# Patient Record
Sex: MALE | Race: WHITE | NOT HISPANIC OR LATINO | Employment: FULL TIME | URBAN - METROPOLITAN AREA
[De-identification: names, ages, dates, MRNs, and addresses within clinical notes are randomized per-mention and may not be internally consistent; named-entity substitution may affect disease eponyms.]

---

## 2024-05-16 ENCOUNTER — HOSPITAL ENCOUNTER (EMERGENCY)
Facility: CLINIC | Age: 29
Discharge: LEFT BEFORE DISCHARGE | End: 2024-05-16
Attending: EMERGENCY MEDICINE | Admitting: EMERGENCY MEDICINE
Payer: COMMERCIAL

## 2024-05-16 ENCOUNTER — APPOINTMENT (OUTPATIENT)
Dept: RADIOLOGY | Facility: CLINIC | Age: 29
End: 2024-05-16
Attending: EMERGENCY MEDICINE
Payer: COMMERCIAL

## 2024-05-16 VITALS
HEART RATE: 59 BPM | HEIGHT: 65 IN | OXYGEN SATURATION: 97 % | DIASTOLIC BLOOD PRESSURE: 59 MMHG | WEIGHT: 138 LBS | RESPIRATION RATE: 14 BRPM | SYSTOLIC BLOOD PRESSURE: 95 MMHG | TEMPERATURE: 97.7 F | BODY MASS INDEX: 22.99 KG/M2

## 2024-05-16 DIAGNOSIS — T40.2X1A OPIOID OVERDOSE, ACCIDENTAL OR UNINTENTIONAL, INITIAL ENCOUNTER (H): ICD-10-CM

## 2024-05-16 DIAGNOSIS — Z53.29 LEFT AGAINST MEDICAL ADVICE: ICD-10-CM

## 2024-05-16 LAB
ATRIAL RATE - MUSE: 64 BPM
DIASTOLIC BLOOD PRESSURE - MUSE: 97 MMHG
INTERPRETATION ECG - MUSE: NORMAL
P AXIS - MUSE: 22 DEGREES
PR INTERVAL - MUSE: 132 MS
QRS DURATION - MUSE: 96 MS
QT - MUSE: 378 MS
QTC - MUSE: 389 MS
R AXIS - MUSE: 20 DEGREES
SYSTOLIC BLOOD PRESSURE - MUSE: 139 MMHG
T AXIS - MUSE: 45 DEGREES
VENTRICULAR RATE- MUSE: 64 BPM

## 2024-05-16 PROCEDURE — 250N000011 HC RX IP 250 OP 636: Mod: JZ | Performed by: EMERGENCY MEDICINE

## 2024-05-16 PROCEDURE — 258N000003 HC RX IP 258 OP 636: Performed by: EMERGENCY MEDICINE

## 2024-05-16 PROCEDURE — 96361 HYDRATE IV INFUSION ADD-ON: CPT

## 2024-05-16 PROCEDURE — 93005 ELECTROCARDIOGRAM TRACING: CPT | Performed by: EMERGENCY MEDICINE

## 2024-05-16 PROCEDURE — 99284 EMERGENCY DEPT VISIT MOD MDM: CPT | Mod: 25

## 2024-05-16 PROCEDURE — 96374 THER/PROPH/DIAG INJ IV PUSH: CPT | Mod: 59

## 2024-05-16 PROCEDURE — 71046 X-RAY EXAM CHEST 2 VIEWS: CPT

## 2024-05-16 RX ORDER — NALOXONE HYDROCHLORIDE 1 MG/ML
0.2 INJECTION INTRAMUSCULAR; INTRAVENOUS; SUBCUTANEOUS ONCE
Status: COMPLETED | OUTPATIENT
Start: 2024-05-16 | End: 2024-05-16

## 2024-05-16 RX ADMIN — NALOXONE HYDROCHLORIDE 0.2 MG: 1 INJECTION PARENTERAL at 22:39

## 2024-05-16 RX ADMIN — SODIUM CHLORIDE 1000 ML: 9 INJECTION, SOLUTION INTRAVENOUS at 21:44

## 2024-05-16 ASSESSMENT — COLUMBIA-SUICIDE SEVERITY RATING SCALE - C-SSRS
6. HAVE YOU EVER DONE ANYTHING, STARTED TO DO ANYTHING, OR PREPARED TO DO ANYTHING TO END YOUR LIFE?: NO
2. HAVE YOU ACTUALLY HAD ANY THOUGHTS OF KILLING YOURSELF IN THE PAST MONTH?: NO
1. IN THE PAST MONTH, HAVE YOU WISHED YOU WERE DEAD OR WISHED YOU COULD GO TO SLEEP AND NOT WAKE UP?: NO

## 2024-05-16 ASSESSMENT — ACTIVITIES OF DAILY LIVING (ADL)
ADLS_ACUITY_SCORE: 37

## 2024-05-17 NOTE — ED NOTES
Dr. Almaguer at bedside explaining the risks of pt the leaving against medical advice. Verbalized understanding and still wanting to leave. PIV removed, AMA form signed witnessed by this writer. Pt called for an uber.

## 2024-05-17 NOTE — ED NOTES
Bed: [facility]  Expected date:   Expected time:   Means of arrival:   Comments:  ems

## 2024-05-17 NOTE — ED PROVIDER NOTES
EMERGENCY DEPARTMENT ENCOUNTER      NAME: Issa Scott  AGE: 29 year old male  YOB: 1995  MRN: [id number]  EVALUATION DATE & TIME: 5/16/2024  8:04 PM    PCP: System, Provider Not In    ED PROVIDER: Timothy Almaguer MD      Chief Complaint   Patient presents with    Drug Overdose         FINAL IMPRESSION:  1. Opioid overdose, accidental or unintentional, initial encounter (H)    2. Left against medical advice          ED COURSE & MEDICAL DECISION MAKING:    Pertinent Labs & Imaging studies reviewed. (See chart for details)  29 year old male presents to the Emergency Department for evaluation of heroin overdose    Presents with GCS 15.    Patient got 0.5 intranasal Narcan by EMS.  I spoke with poison control recommends 1 hour observation      ED Course as of 05/16/24 2328   Thu May 16, 2024   2114 Spoke with poison control who recommends 1 hour observation minimum   2138 Patient presents after getting CPR after injecting heroin   2138 Chest x-ray ordered since patient did get CPR to evaluate for any pneumothorax.  Chest x-ray does not show any fractured ribs or pneumothorax per my independent read   2141 Patient was alarming that he was apneic but I arrived nursing says it is a poor waveform and he is arousable to voice with respiratory rate of 12 and end-tidal 50 will continue to watch closely.  He is in a hallway bed right in front of the charge desk.   2253 I did recheck patient and his respiratory rate was 18 end-tidal was 58 so I ordered 0.2 of IV Narcan.  He is now awake and refusing to stay.  He states he been through chemical dependency treatment before.  He states he has Narcan at home.  He is gone for treatment bunch of times.  He says he is good to avoid any more opiates.  He is refusing to stay for couple more hours of observation   2254 Patient will leave AGAINST MEDICAL ADVICE.  He has capacity to make this decision.  He is not legally holdable based on my assessment.  He has Narcan at home and  capacity.   6774 I speak with poison control earlier and they recommend 1 hour observation post initial Narcan dose     8:20 PM I met with the patient, obtained history, performed an initial exam, and discussed options and plan for diagnostics and treatment here in the ED.  10:22 PM I rechecked patient.  10:53 PM Nurse reports the patient wants to go home. Patient rechecked. The patient explained that he has both injectable and intranasal narcan at home. Discussed the dangers of the patient leaving AMA, patient understands that he could stop breathing if he goes home. Upon exam, patient has the capacity to decide to leave. The patient is discharging AMA.    Medical Decision Making  Obtained supplemental history:Supplemental history obtained?: EMS  Reviewed external records: External records reviewed?: Documented in chart  Care impacted by chronic illness:Mental Health and Other: asthma  Care significantly affected by social determinants of health:Alcohol Abuse and/or Recreational Drug Use  Did you consider but not order tests?: Work up considered but not performed and documented in chart, if applicable  Did you interpret images independently?: Independent interpretation of ECG and images noted in documentation, when applicable.  Consultation discussion with other provider:Did you involve another provider (consultant, , pharmacy, etc.)?: I discussed the care with another health care provider, see documentation for details.  Discharge. No recommendations on prescription strength medication(s). I considered admission, but ultimately discharged patient after patient refused further evaluation.    At the conclusion of the encounter I discussed the results of all of the tests and the disposition. The questions were answered. The patient or family acknowledged understanding and was agreeable with the care plan.     MEDICATIONS GIVEN IN THE EMERGENCY:  Medications   sodium chloride 0.9% BOLUS 1,000 mL (0 mLs Intravenous  Stopped 5/16/24 1269)   naloxone (NARCAN) injection 0.2 mg (0.2 mg Intravenous $Given 5/16/24 0213)       NEW PRESCRIPTIONS STARTED AT TODAY'S ER VISIT  Discharge Medication List as of 5/16/2024 10:55 PM             =================================================================    HPI    Patient information was obtained from: Patient, EMS    Use of : N/A         Issa Scott is a 29 year old male with a pertinent history of heroin addiction, marijuana abuse, chemical dependency, anxiety, polydrug abuse, seizure disorder, depression, asthma who presents to this ED via EMS for evaluation of accidental drug overdose.    Per EMS, patient was found unresponsive with his friend performing CPR on him. EMS continued CPR at the scene, inserted OPA, and gave 0.5 mg of Narcan just prior to arrival. Patient is alert and orientated x 4.     Per patient, he reports the last thing he remembers is using heroin and then waking up to paramedics surrounding him. He used heroin intravenously around 7 pm. He did not take anything else. Patient reports feeling tired. This is the first time he overdosed and states that he hasn't used heroin for awhile. Patient is currently in a DBT program. He was taking methadone and suboxone but relapsed on meth in February.  Not currently on any long-acting agonist such as methadone or Suboxone    Denies being suicidal.    Is currently in DBT treatment        REVIEW OF SYSTEMS   Review of Systems Refer to HPI    PAST MEDICAL HISTORY:  Past Medical History:   Diagnosis Date    Asthma     Environmental allergies        PAST SURGICAL HISTORY:  Past Surgical History:   Procedure Laterality Date    TONSILLECTOMY  2000           CURRENT MEDICATIONS:    albuterol (PROVENTIL HFA: VENTOLIN HFA) 108 (90 BASE) MCG/ACT inhaler  fexofenadine (ALLEGRA) 30 MG tablet  fluticasone (FLONASE) 50 MCG/ACT nasal spray  fluticasone-salmeterol (ADVAIR DISKUS) 100-50 MCG/DOSE diskus inhaler  METHADONE HCL  "PO        ALLERGIES:  Allergies   Allergen Reactions    Other Environmental Allergy Unknown       FAMILY HISTORY:  Family History   Problem Relation Age of Onset    Asthma Brother     C.A.D. No family hx of     Diabetes No family hx of     Hypertension No family hx of     Cancer - colorectal No family hx of     Prostate Cancer No family hx of     Alcohol/Drug Father     Alcohol/Drug Maternal Grandfather        SOCIAL HISTORY:   Social History     Socioeconomic History    Marital status: Single   Substance and Sexual Activity    Alcohol use: Yes    Drug use: Yes     Types: Methamphetamines     Comment: 1 week ago    Sexual activity: Yes     Partners: Female   Social History Narrative    Lives in [place] with mom and step-dad. Does not get along with parents.  Goes to [employer] [place].       Social Determinants of Health     Financial Resource Strain: Low Risk  (5/16/2022)    [facility]    Financial Resource Strain     Difficulty of Paying Living Expenses: 3   Food Insecurity: No Food Insecurity (5/16/2022)    [facility]    Food Insecurity     Worried About Running Out of Food in the Last Year: 1   Transportation Needs: No Transportation Needs (5/16/2022)    Received from [facility]    Transportation Needs     Lack of Transportation (Medical): 1    [facility]    Social Connections   Housing Stability: Low Risk  (5/16/2022)    Received from [facility]    Housing Stability     Unable to Pay for Housing in the Last Year: 1       VITALS:  BP 95/59   Pulse 59   Temp 97.7  F (36.5  C)   Resp 14   Ht 1.651 m (5' 5\")   Wt 62.6 kg (138 lb)   SpO2 97%   BMI 22.96 kg/m      PHYSICAL EXAM      Vitals: BP 95/59   Pulse 59   Temp 97.7  F (36.5  C)   Resp 14   Ht 1.651 m (5' 5\")   Wt 62.6 kg " (138 lb)   SpO2 97%   BMI 22.96 kg/m    General: Appears in no acute distress, drowsy. Wakes up to voice.  Eyes: Conjunctivae non-injected. Sclera anicteric. Pinpoint pupils.  HENT: Atraumatic.  Neck: Supple.  Respiratory/Chest: Respiration unlabored.  Mild hypoventilation respiratory rate 8-12.  No hypoxemia  Abdomen: non distended  Musculoskeletal: Normal extremities. No edema or erythema.  Skin: Normal color. No rash or diaphoresis.  Neurologic: Face symmetric, moves all extremities spontaneously. Speech clear.  Psychiatric: Oriented to person, place, and time. Affect appropriate.    LAB:  All pertinent labs reviewed and interpreted.  Results for orders placed or performed during the hospital encounter of 24   Chest XR,  PA & LAT    Impression    IMPRESSION: Negative chest.   ECG 12-LEAD WITH MUSE (LHE)   Result Value Ref Range    Systolic Blood Pressure 139 mmHg    Diastolic Blood Pressure 97 mmHg    Ventricular Rate 64 BPM    Atrial Rate 64 BPM    MN Interval 132 ms    QRS Duration 96 ms     ms    QTc 389 ms    P Axis 22 degrees    R AXIS 20 degrees    T Axis 45 degrees    Interpretation ECG       Sinus rhythm  Normal ECG  No previous ECGs available  Confirmed by SEE ED PROVIDER NOTE FOR, ECG INTERPRETATION ([id number]),  FREDIS WHARTON ([id number]) on 2024 9:34:13 PM       EK May 2024 2009  Sinus rhythm  No previous ECG  Ventricular rate 6 4  MN interval 132  QRS 96  QTc 389  Axis 20  No ST change        RADIOLOGY:  Reviewed all pertinent imaging. Please see official radiology report.  Chest XR,  PA & LAT   Final Result   IMPRESSION: Negative chest.                I, Loulou Sweeney, am serving as a scribe to document services personally performed by Timothy Almaguer MD based on my observation and the provider's statements to me. I, Timothy Almaguer MD, attest that Loulou Sweeney is acting in a scribe capacity, has observed my performance of the services and has documented them in accordance with my  direction.    Timothy Almaguer MD  [facility] EMERGENCY ROOM  [facility] [facility] [id number] [phone]        Timothy Almaguer MD  05/16/24 2326

## 2024-05-17 NOTE — DISCHARGE INSTRUCTIONS
Chemical Dependency Resources    To access chemical dependency treatment you must have an assessment complete or have an updated assessment within 30 days of starting any program.   Information for this to be completed and to secure funding if you have medical assistance or no insurance can be found through your County's chemical health intake line.     If you have private insurance, call the customer service number on the back of your insurance card to find an in-network provider for substance abuse assessment. The ideal provider will be a treatment facility, licensed in the [facility].     For those with Medicaid with the [facility], you will need a Rule 25 assessment: The following are phone numbers for each [facility]. Rule 25 assessments must be completed by the county you reside in currently. Once approved for funding you can connect with a facility that does Rule 25 assessment.   [facility] - [phone]  [place] - [phone]  [facility] [phone]  [facility] [phone]  [facility] [phone]  [facility] [phone]  [place] - [phone]  [facility] [phone]    [place] [facility]  www.mntc.org  [phone] [address] [facility]  Serves adults and teens (minimum age is 16); has short-term treatment and a long-term recovery program; uses 12-step, cognitive behavioral therapy, motivational enhancement; faithbased, and recovery management; high school on-site and GED programming available      [facility]  [phone]  Mon-Friday: [address] [facility], [address]  Saturday: [facility] [facility] [address]  M-F assessments (7a-1:45p); Saturday assessments (7:45-10:45a)    [facility]  [phone]  [address] [place], [place], [address]  *by appointment only M-W; walk ins available Fridays from 10-2.      [facility] (Inpatient & Outpatient)  [email or web address]  [phone]  Phone consultation available 24/7  In-person Assessments  [address], [place] [address]  [address] [address] [address]  [address] [facility], [place] [address]  [address] [place] [address]      [facility]  [phone]  [address] [facility]  [place], [place], [address]  *walk in and appointments available by calling     [facility]  [phone] [address] [place], [place], [address]  *by appointment only M-Th    [facility] Adult Mental Health  [phone]  [address] [place], [place], [address]  *walk ins available M-F    [facility]  [phone]  [address] [place], [address]  *available by appointments only    [facility]  [phone]  [address] [place], [place], [address]  *available by appointment only    [facility] (Inpatient & Outpatient)  [email or web address]-health-[email or web address]/[email or web address]  [place], [place]  Contact  for Chemical Health Evaluation, [phone]  Funded by: Rule 25 in [facility] and [facility]. Medical Assistance (MA) providers of [facility], [facility], Blue Cross, [facility], [employer], Medicare A&B. Private insurance reviewed case by case.        [facility]  [email or web address]//[email or web address]/mental-health/adap.html  [phone]  Alcohol and Drug Abuse Program - [address], [address]  [place] [address]  Assessments are available during the day and on a walk-in basis Monday-Friday starting at 8am.      [facility]  [email or web address]  [phone] or [phone]  [address] [place] [address]  Services include screening assessments, medically supervised detoxification, inpatient and outpatient evaluation and referral, combined inpatient to outpatient treatment sequences, family counseling and aftercare.    [facility]  [phone]  [address] [place], [place], [address]  *walk in assessments available M-F  starting at 7 am.     [facility]   [phone]  [facility]  [address]  [place], [address]  *Walk in assessments availble M-F starting at 8 am    OR     [phone]  [facility]  [address] [place] [address]  *Walk in assessments available M-F starting at 8 am    [facility]  [phone]  [address] [place] [address]    [facility]   [phone]  [address] [place], [place], [address]  *available by appointment only    [facility]   [phone]  [address]  [place], [address]    [facility]  [phone]  [address] [place], [address]  *available by appointment    [facility]  [phone]  [address] [place], [address]  *walk ins available M-TH from 9-3    [facility]  [phone]  [address] [place], [address]    [place]  [email or web address]  [phone] [address] [place] [address]  [address] [address] [address]  Rule 25 Assessments, Substance Abuse Assessments, Men s & Women s Progr    [facility] (Does Rule 25 Assessments)  http://[email or web address]/  [phone]  [address] [place] [address], [address], [place] [address]    [facility]  [email or web address]-treatment  [phone], [address], [place], [place] [address] [phone], [address], [place], [place] [address] [phone], [address] [facility], [address], [place], [place] [address] [phone], [address], ([facility]), [place], [place] [id number] [phone], [address] [address] [place] [address]    If you are intoxicated, you may be required to detox at a detox facility before starting treatment. The following are detox facilities that you can self present to. All detox  facilities are able to help you complete an assessment/rule 25 prior to discharge if you choose:    [facility]: [address] [place], [place], [address].   [phone]  [facility]: [address] [place], [place], [address]  [phone]  [facility]: [address] [place], [place], [address]   [phone]  [facility]: [address] [place], [place], [address] [phone]    It is recommended that you abstain from all mood altering chemicals. Please contact the sober support hotline (539-515-3018) as needed; phones are answered 24 hours a day, 7 days a week. Other support hotlines include:  [facility] Mental Health & Addiction: [phone]  [facility] Support Line: [phone]    Ways to help cope with sobriety:   -- Take prescribed medicines as scheduled  -- Keep follow-up appointments   -- Talk to others about your concerns  -- Get regular exercise  -- Practice deep breathing skills   -- Eat a healthy diet   -- Use community resources, including hotline numbers, Critical access hospital crisis and support meetings  -- Stay sober and avoid places/people/things associated with substance use   --Maintain a daily schedule/routine   --Get at least 7-8 hours of sleep per night   --Create a list 10--20 healthy activities that you can do that are enjoyable and do not involve substance use   --Create daily goals (approx. 1-4 goals) per day and work to achieve them throughout the day.    [place] [facility] ([facility])  [facility] connects people seeking recovery to resources that help foster and sustain long-term recovery. Whether you are seeking resources for treatment, transportation, housing, job training, education, health care or other pathways to recovery, [facility] is a great place to start.     Phone: [phone]. www.[employer].org (Great listing of all types of recovery and non-recovery related resources)    [facility] [place]  Phone: 1-800-ALCOHOL  Website: HTTP://WWW.AA.ORG/    [facility] [place]  (970.639.8022 or http://aamin[place].org)  [facility] [place] (198-525-6978 or www.aasaul.org)    Narcotics Anonymous  Phone: 402.426.3736  Website: www.Online Prasad.Computime.    [facility] [address], [place], [place],   Phone: [phone]  Drop-in Hours: Monday-Friday 9-11:30 am. By appointment at other times.  Provides: [facility] is a drop-in center on the east side [facility] that provides a safe space for individuals who are homeless and have a history of chemical use. Sobriety is not a requirement but drugs and alcohol are not allowed on the property.  Services: Non-clients can access drop-in services such as Recovery and Harm Reduction Groups, referrals to case management, community activities, shower facilities, and a pool table. Individuals who are homeless and have chemical health needs may be eligible for enrollment into [facility]'s case management program. Clients and  work together to access benefits, treatment, health care, shelter, and external housing resources.    [facility] Chemical Assessment [facility] Referral [facility]  [address] [place], [place]  Phone: [phone]  Hours: Monday-Friday 8 am-5 pm.  Provides: Rule 25 assessment and referral for individuals seeking treatment or counseling for chemical dependency. Must be a resident of [facility]. There is no fee for assessment. There is some funding available for treatment programs.    [facility]   [address] [facility] Phone: [phone] Main: [phone]  Hours: Monday through Friday 7 am-5 pm  Provides: Daily drop-in Rule 25 assessments and weekly mental health assessments and services. Outpatient chemical dependency treatment (with sober recovery housing), relapse prevention, case management, and employment services. Outpatient and residential treatment for women with children. Specializes in assisting individuals with a history of relapse who face multiple barriers to  achieving stable recovery.  Remarks: No charge for most services or services can be billed through insurance. Gender-specific services and treatment for co-occurring substance abuse and mental health concerns offered.

## 2024-05-17 NOTE — ED NOTES
"Pt stated \"You guys aren't doing shit for me here, so I'm just gonna go.\" This was endorsed minutes after he received Narcan.   "

## 2024-05-17 NOTE — ED TRIAGE NOTES
Pt comes to the ED via EMS following a heroine overdose. EMS performed CPR on scene, inserted OPA, and gave 0.5 mg of Narcan just PTA. Pt is Aox4 in triage, ambulatory, and vitally stable.      Triage Assessment (Adult)       Row Name 05/16/24 2006          Triage Assessment    Airway WDL WDL        Respiratory WDL    Respiratory WDL WDL        Skin Circulation/Temperature WDL    Skin Circulation/Temperature WDL WDL        Cardiac WDL    Cardiac WDL WDL        Peripheral/Neurovascular WDL    Peripheral Neurovascular WDL WDL        Cognitive/Neuro/Behavioral WDL    Cognitive/Neuro/Behavioral WDL WDL

## 2024-06-27 ENCOUNTER — APPOINTMENT (OUTPATIENT)
Dept: CT IMAGING | Facility: CLINIC | Age: 29
End: 2024-06-27
Attending: EMERGENCY MEDICINE
Payer: COMMERCIAL

## 2024-06-27 VITALS
OXYGEN SATURATION: 99 % | SYSTOLIC BLOOD PRESSURE: 96 MMHG | HEART RATE: 95 BPM | TEMPERATURE: 97.1 F | HEIGHT: 65 IN | BODY MASS INDEX: 21.36 KG/M2 | WEIGHT: 128.2 LBS | RESPIRATION RATE: 20 BRPM | DIASTOLIC BLOOD PRESSURE: 64 MMHG

## 2024-06-27 PROBLEM — R40.20 LOC (LOSS OF CONSCIOUSNESS) (H): Status: ACTIVE | Noted: 2022-05-16

## 2024-06-27 PROBLEM — R56.9 SEIZURE-LIKE ACTIVITY (H): Status: ACTIVE | Noted: 2022-05-16

## 2024-06-27 PROBLEM — F13.20 BENZODIAZEPINE DEPENDENCE (H): Status: ACTIVE | Noted: 2021-01-16

## 2024-06-27 PROBLEM — F19.10 POLYDRUG ABUSE (H): Status: ACTIVE | Noted: 2024-06-27

## 2024-06-27 PROBLEM — F19.20 CHEMICAL DEPENDENCY (H): Status: ACTIVE | Noted: 2022-05-16

## 2024-06-27 PROBLEM — F11.20 OPIOID USE DISORDER, SEVERE, DEPENDENCE (H): Status: ACTIVE | Noted: 2021-01-16

## 2024-06-27 PROBLEM — G40.909 SEIZURE DISORDER (H): Status: ACTIVE | Noted: 2022-08-02

## 2024-06-27 PROCEDURE — 99281 EMR DPT VST MAYX REQ PHY/QHP: CPT | Mod: 25

## 2024-06-27 PROCEDURE — 70450 CT HEAD/BRAIN W/O DYE: CPT

## 2024-06-27 RX ORDER — LEVETIRACETAM 500 MG/1
500 TABLET ORAL
COMMUNITY
Start: 2022-08-02

## 2024-06-28 ENCOUNTER — HOSPITAL ENCOUNTER (EMERGENCY)
Facility: CLINIC | Age: 29
Discharge: LEFT BEFORE DISCHARGE | End: 2024-06-28
Attending: EMERGENCY MEDICINE | Admitting: EMERGENCY MEDICINE
Payer: COMMERCIAL

## 2024-06-28 ENCOUNTER — OFFICE VISIT (OUTPATIENT)
Dept: FAMILY MEDICINE | Facility: CLINIC | Age: 29
End: 2024-06-28
Payer: COMMERCIAL

## 2024-06-28 VITALS
TEMPERATURE: 98.1 F | DIASTOLIC BLOOD PRESSURE: 79 MMHG | OXYGEN SATURATION: 97 % | SYSTOLIC BLOOD PRESSURE: 121 MMHG | HEART RATE: 84 BPM | RESPIRATION RATE: 16 BRPM

## 2024-06-28 DIAGNOSIS — S01.01XA LACERATION OF SCALP, INITIAL ENCOUNTER: Primary | ICD-10-CM

## 2024-06-28 DIAGNOSIS — G40.909 SEIZURE DISORDER (H): ICD-10-CM

## 2024-06-28 PROCEDURE — 99204 OFFICE O/P NEW MOD 45 MIN: CPT | Performed by: NURSE PRACTITIONER

## 2024-06-28 RX ORDER — CLONAZEPAM 1 MG/1
1 TABLET ORAL 3 TIMES DAILY PRN
COMMUNITY

## 2024-06-28 RX ORDER — GABAPENTIN 300 MG/1
300 CAPSULE ORAL 3 TIMES DAILY
COMMUNITY

## 2024-06-28 ASSESSMENT — ACTIVITIES OF DAILY LIVING (ADL): ADLS_ACUITY_SCORE: 35

## 2024-06-28 NOTE — ED TRIAGE NOTES
Pt arrives to ed with c/o of seizure that caused pt to fall backwards and hit head on wall, pt has lac on back of head.  Has hx of seizures.      Triage Assessment (Adult)       Row Name 06/27/24 0193          Triage Assessment    Airway WDL WDL        Respiratory WDL    Respiratory WDL WDL        Cognitive/Neuro/Behavioral WDL    Cognitive/Neuro/Behavioral WDL WDL

## 2024-06-28 NOTE — PROGRESS NOTES
Chief Complaint   Patient presents with    Laceration     STATES HAD SEIZURE 5 DAYS HAD LACERATION ON BACK OF HEAD WAS HEALING NOW STATES RE OPENED LAST NIGHT     SUBJECTIVE:  Issa Scott is a 29 year old male presenting for second opinion on scalp laceration.  He left the ER AMA last night (security was called) after being advised he needed staples.  He does not want to get staples.  He has a seizure and substance abuse disorder on Keppra methadone and Klonopin.  Obtained a CT scan at the ER today which was unremarkable.  Initial injury was a seizure 5 days ago falling into the wall.  He busted the laceration open yesterday without nausea or seizure.  Declines severe headache vomiting confusion raccoon Fonseca sign weakness neurodeficits.  No pus or signs of infection.  Tdap last done 2/6/2015.    Past Medical History:   Diagnosis Date    Asthma     Environmental allergies      Current Outpatient Medications   Medication Sig Dispense Refill    albuterol (PROVENTIL HFA: VENTOLIN HFA) 108 (90 BASE) MCG/ACT inhaler Inhale 2 puffs into the lungs every 6 hours as needed. 1 Inhaler 0    clonazePAM (KLONOPIN) 1 MG tablet Take 1 mg by mouth 3 times daily as needed      fexofenadine (ALLEGRA) 30 MG tablet Take 1 tablet by mouth daily. 30 tablet 0    gabapentin (NEURONTIN) 300 MG capsule Take 300 mg by mouth 3 times daily      fluticasone (FLONASE) 50 MCG/ACT nasal spray 2 sprays by Both Nostrils route daily. 1 Package 0    fluticasone-salmeterol (ADVAIR DISKUS) 100-50 MCG/DOSE diskus inhaler Inhale 1 puff into the lungs every 12 hours. 1 Inhaler 0    levETIRAcetam (KEPPRA) 500 MG tablet Take 500 mg by mouth      METHADONE HCL PO Take 90 mg by mouth       No current facility-administered medications for this visit.     Social History     Tobacco Use    Smoking status: Not on file    Smokeless tobacco: Not on file   Substance Use Topics    Alcohol use: Yes     Allergies   Allergen Reactions    Other Environmental Allergy  Unknown       Review of Systems  All systems negative except for those listed above in HPI.    OBJECTIVE:   /79   Pulse 84   Temp 98.1  F (36.7  C) (Oral)   Resp 16   SpO2 97%     Physical Exam  Vitals reviewed.   Constitutional:       Appearance: Normal appearance.   HENT:      Head:      Comments: 4 cm laceration to posterior scalp that is healing well and approximate with scabbing     Nose: Nose normal.      Mouth/Throat:      Mouth: Mucous membranes are moist.      Pharynx: Oropharynx is clear.   Eyes:      Extraocular Movements: Extraocular movements intact.      Conjunctiva/sclera: Conjunctivae normal.      Pupils: Pupils are equal, round, and reactive to light.   Cardiovascular:      Rate and Rhythm: Normal rate.      Pulses: Normal pulses.   Pulmonary:      Effort: Pulmonary effort is normal.   Musculoskeletal:         General: Normal range of motion.      Cervical back: Normal range of motion and neck supple.   Skin:     General: Skin is warm and dry.      Findings: No rash.   Neurological:      General: No focal deficit present.      Mental Status: He is alert and oriented to person, place, and time.   Psychiatric:         Mood and Affect: Mood normal.         Behavior: Behavior normal.       ASSESSMENT:    ICD-10-CM    1. Laceration of scalp, initial encounter  S01.01XA       2. Seizure disorder (H)  G40.909         PLAN:     Discussed the risk of suturing outside of the 16-hour window with patient  Shared decision making to not suture or staple of this wound given the risk for infection and poor wound healing  Do not submerge under water or use shampoo for 48 hours  Thereafter can gently wash and apply bacitracin zinc daily  Monitor for signs of infection including redness warmth pus  Tetanus is up-to-date  PCP neuro follow-up  Urgent care cannot evaluate or manage seizures  Reevaluation at the ER if recurrent seizures, severe headache vomiting vision change weakness dizziness bruising under  eyes or at the nape of neck    Follow up with primary care provider with any problems, questions or concerns or if symptoms worsen or fail to improve. Patient agreed to plan and verbalized understanding.    BRITTNEY Mcneill-[facility]

## 2024-06-28 NOTE — ED NOTES
Pt became verbally aggressive multiple times in the waiting area, requiring security intervention when he began to direct his anger at other patients and visitors. Pt was seen walking out of department shortly after.